# Patient Record
Sex: MALE | Race: OTHER | HISPANIC OR LATINO | Employment: FULL TIME | URBAN - METROPOLITAN AREA
[De-identification: names, ages, dates, MRNs, and addresses within clinical notes are randomized per-mention and may not be internally consistent; named-entity substitution may affect disease eponyms.]

---

## 2018-10-03 RX ORDER — OMEPRAZOLE 40 MG/1
1 CAPSULE, DELAYED RELEASE ORAL
COMMUNITY
Start: 2017-10-04 | End: 2019-05-24 | Stop reason: SDUPTHER

## 2019-05-24 ENCOUNTER — OFFICE VISIT (OUTPATIENT)
Dept: FAMILY MEDICINE CLINIC | Facility: CLINIC | Age: 49
End: 2019-05-24
Payer: COMMERCIAL

## 2019-05-24 VITALS
DIASTOLIC BLOOD PRESSURE: 80 MMHG | BODY MASS INDEX: 27.21 KG/M2 | SYSTOLIC BLOOD PRESSURE: 120 MMHG | HEIGHT: 67 IN | RESPIRATION RATE: 20 BRPM | HEART RATE: 93 BPM | OXYGEN SATURATION: 98 % | WEIGHT: 173.4 LBS | TEMPERATURE: 97.1 F

## 2019-05-24 DIAGNOSIS — K21.9 GASTROESOPHAGEAL REFLUX DISEASE WITHOUT ESOPHAGITIS: ICD-10-CM

## 2019-05-24 DIAGNOSIS — R31.9 HEMATURIA, UNSPECIFIED TYPE: Primary | ICD-10-CM

## 2019-05-24 DIAGNOSIS — N40.0 BENIGN PROSTATIC HYPERPLASIA WITHOUT LOWER URINARY TRACT SYMPTOMS: ICD-10-CM

## 2019-05-24 LAB
SL AMB  POCT GLUCOSE, UA: NEGATIVE
SL AMB LEUKOCYTE ESTERASE,UA: NEGATIVE
SL AMB POCT BILIRUBIN,UA: NEGATIVE
SL AMB POCT BLOOD,UA: ABNORMAL
SL AMB POCT CLARITY,UA: CLEAR
SL AMB POCT COLOR,UA: YELLOW
SL AMB POCT KETONES,UA: NEGATIVE
SL AMB POCT NITRITE,UA: NEGATIVE
SL AMB POCT PH,UA: 5
SL AMB POCT SPECIFIC GRAVITY,UA: 1.02
SL AMB POCT URINE PROTEIN: NEGATIVE
SL AMB POCT UROBILINOGEN: 0.2

## 2019-05-24 PROCEDURE — 99214 OFFICE O/P EST MOD 30 MIN: CPT | Performed by: NURSE PRACTITIONER

## 2019-05-24 PROCEDURE — 81002 URINALYSIS NONAUTO W/O SCOPE: CPT | Performed by: NURSE PRACTITIONER

## 2019-05-24 RX ORDER — TERAZOSIN 1 MG/1
1 CAPSULE ORAL
Qty: 30 CAPSULE | Refills: 1 | Status: SHIPPED | OUTPATIENT
Start: 2019-05-24 | End: 2019-06-28

## 2019-05-24 RX ORDER — OMEPRAZOLE 40 MG/1
40 CAPSULE, DELAYED RELEASE ORAL DAILY
Qty: 30 CAPSULE | Refills: 1 | Status: SHIPPED | OUTPATIENT
Start: 2019-05-24 | End: 2019-07-26 | Stop reason: ALTCHOICE

## 2019-05-28 PROBLEM — R31.9 HEMATURIA: Status: ACTIVE | Noted: 2019-05-28

## 2019-05-28 PROBLEM — N40.0 BENIGN PROSTATIC HYPERPLASIA WITHOUT LOWER URINARY TRACT SYMPTOMS: Status: ACTIVE | Noted: 2019-05-28

## 2019-06-28 ENCOUNTER — OFFICE VISIT (OUTPATIENT)
Dept: FAMILY MEDICINE CLINIC | Facility: CLINIC | Age: 49
End: 2019-06-28
Payer: COMMERCIAL

## 2019-06-28 VITALS
RESPIRATION RATE: 20 BRPM | HEART RATE: 82 BPM | DIASTOLIC BLOOD PRESSURE: 90 MMHG | TEMPERATURE: 97.7 F | OXYGEN SATURATION: 98 % | HEIGHT: 67 IN | BODY MASS INDEX: 27.78 KG/M2 | WEIGHT: 177 LBS | SYSTOLIC BLOOD PRESSURE: 138 MMHG

## 2019-06-28 DIAGNOSIS — Z13.220 SCREENING FOR CHOLESTEROL LEVEL: ICD-10-CM

## 2019-06-28 DIAGNOSIS — N40.1 BENIGN PROSTATIC HYPERPLASIA WITH URINARY FREQUENCY: Primary | ICD-10-CM

## 2019-06-28 DIAGNOSIS — E66.3 OVERWEIGHT (BMI 25.0-29.9): ICD-10-CM

## 2019-06-28 DIAGNOSIS — R35.0 BENIGN PROSTATIC HYPERPLASIA WITH URINARY FREQUENCY: Primary | ICD-10-CM

## 2019-06-28 DIAGNOSIS — Z00.01 ENCOUNTER FOR WELL ADULT EXAM WITH ABNORMAL FINDINGS: ICD-10-CM

## 2019-06-28 DIAGNOSIS — Z23 NEED FOR TDAP VACCINATION: ICD-10-CM

## 2019-06-28 DIAGNOSIS — R73.9 HYPERGLYCEMIA: ICD-10-CM

## 2019-06-28 DIAGNOSIS — R31.9 HEMATURIA, UNSPECIFIED TYPE: ICD-10-CM

## 2019-06-28 LAB
SL AMB  POCT GLUCOSE, UA: NEGATIVE
SL AMB LEUKOCYTE ESTERASE,UA: NEGATIVE
SL AMB POCT BILIRUBIN,UA: NEGATIVE
SL AMB POCT BLOOD,UA: NEGATIVE
SL AMB POCT CLARITY,UA: CLEAR
SL AMB POCT COLOR,UA: NORMAL
SL AMB POCT KETONES,UA: NEGATIVE
SL AMB POCT NITRITE,UA: NEGATIVE
SL AMB POCT PH,UA: 6
SL AMB POCT SPECIFIC GRAVITY,UA: 1
SL AMB POCT URINE PROTEIN: NEGATIVE
SL AMB POCT UROBILINOGEN: 0.2

## 2019-06-28 PROCEDURE — 99214 OFFICE O/P EST MOD 30 MIN: CPT | Performed by: NURSE PRACTITIONER

## 2019-06-28 PROCEDURE — 1036F TOBACCO NON-USER: CPT | Performed by: NURSE PRACTITIONER

## 2019-06-28 PROCEDURE — 81002 URINALYSIS NONAUTO W/O SCOPE: CPT | Performed by: NURSE PRACTITIONER

## 2019-06-28 PROCEDURE — 3008F BODY MASS INDEX DOCD: CPT | Performed by: NURSE PRACTITIONER

## 2019-06-28 RX ORDER — TERAZOSIN 5 MG/1
5 CAPSULE ORAL
Qty: 30 CAPSULE | Refills: 0 | Status: SHIPPED | OUTPATIENT
Start: 2019-06-28 | End: 2019-07-26 | Stop reason: ALTCHOICE

## 2019-07-02 ENCOUNTER — APPOINTMENT (OUTPATIENT)
Dept: LAB | Facility: CLINIC | Age: 49
End: 2019-07-02
Payer: COMMERCIAL

## 2019-07-02 DIAGNOSIS — R73.9 HYPERGLYCEMIA: ICD-10-CM

## 2019-07-02 DIAGNOSIS — Z13.220 SCREENING FOR CHOLESTEROL LEVEL: ICD-10-CM

## 2019-07-02 DIAGNOSIS — Z00.01 ENCOUNTER FOR WELL ADULT EXAM WITH ABNORMAL FINDINGS: ICD-10-CM

## 2019-07-02 DIAGNOSIS — E66.3 OVERWEIGHT (BMI 25.0-29.9): ICD-10-CM

## 2019-07-02 LAB
ALBUMIN SERPL BCP-MCNC: 4.1 G/DL (ref 3.5–5)
ALP SERPL-CCNC: 61 U/L (ref 46–116)
ALT SERPL W P-5'-P-CCNC: 29 U/L (ref 12–78)
ANION GAP SERPL CALCULATED.3IONS-SCNC: 6 MMOL/L (ref 4–13)
AST SERPL W P-5'-P-CCNC: 19 U/L (ref 5–45)
BASOPHILS # BLD AUTO: 0.04 THOUSANDS/ΜL (ref 0–0.1)
BASOPHILS NFR BLD AUTO: 1 % (ref 0–1)
BILIRUB SERPL-MCNC: 1.46 MG/DL (ref 0.2–1)
BUN SERPL-MCNC: 11 MG/DL (ref 5–25)
CALCIUM SERPL-MCNC: 9.2 MG/DL (ref 8.3–10.1)
CHLORIDE SERPL-SCNC: 105 MMOL/L (ref 100–108)
CHOLEST SERPL-MCNC: 194 MG/DL (ref 50–200)
CO2 SERPL-SCNC: 26 MMOL/L (ref 21–32)
CREAT SERPL-MCNC: 1.04 MG/DL (ref 0.6–1.3)
EOSINOPHIL # BLD AUTO: 0.95 THOUSAND/ΜL (ref 0–0.61)
EOSINOPHIL NFR BLD AUTO: 13 % (ref 0–6)
ERYTHROCYTE [DISTWIDTH] IN BLOOD BY AUTOMATED COUNT: 12.1 % (ref 11.6–15.1)
EST. AVERAGE GLUCOSE BLD GHB EST-MCNC: 120 MG/DL
GFR SERPL CREATININE-BSD FRML MDRD: 84 ML/MIN/1.73SQ M
GLUCOSE SERPL-MCNC: 100 MG/DL (ref 65–140)
HBA1C MFR BLD: 5.8 % (ref 4.2–6.3)
HCT VFR BLD AUTO: 44.5 % (ref 36.5–49.3)
HDLC SERPL-MCNC: 35 MG/DL (ref 40–60)
HGB BLD-MCNC: 15 G/DL (ref 12–17)
IMM GRANULOCYTES # BLD AUTO: 0.02 THOUSAND/UL (ref 0–0.2)
IMM GRANULOCYTES NFR BLD AUTO: 0 % (ref 0–2)
LDLC SERPL CALC-MCNC: 121 MG/DL (ref 0–100)
LYMPHOCYTES # BLD AUTO: 3.09 THOUSANDS/ΜL (ref 0.6–4.47)
LYMPHOCYTES NFR BLD AUTO: 40 % (ref 14–44)
MCH RBC QN AUTO: 28.7 PG (ref 26.8–34.3)
MCHC RBC AUTO-ENTMCNC: 33.7 G/DL (ref 31.4–37.4)
MCV RBC AUTO: 85 FL (ref 82–98)
MONOCYTES # BLD AUTO: 0.47 THOUSAND/ΜL (ref 0.17–1.22)
MONOCYTES NFR BLD AUTO: 6 % (ref 4–12)
NEUTROPHILS # BLD AUTO: 3 THOUSANDS/ΜL (ref 1.85–7.62)
NEUTS SEG NFR BLD AUTO: 40 % (ref 43–75)
NONHDLC SERPL-MCNC: 159 MG/DL
NRBC BLD AUTO-RTO: 0 /100 WBCS
PLATELET # BLD AUTO: 317 THOUSANDS/UL (ref 149–390)
PMV BLD AUTO: 11.2 FL (ref 8.9–12.7)
POTASSIUM SERPL-SCNC: 3.9 MMOL/L (ref 3.5–5.3)
PROT SERPL-MCNC: 7.7 G/DL (ref 6.4–8.2)
RBC # BLD AUTO: 5.23 MILLION/UL (ref 3.88–5.62)
SODIUM SERPL-SCNC: 137 MMOL/L (ref 136–145)
TRIGL SERPL-MCNC: 188 MG/DL
TSH SERPL DL<=0.05 MIU/L-ACNC: 1.78 UIU/ML (ref 0.36–3.74)
WBC # BLD AUTO: 7.57 THOUSAND/UL (ref 4.31–10.16)

## 2019-07-02 PROCEDURE — 84443 ASSAY THYROID STIM HORMONE: CPT

## 2019-07-02 PROCEDURE — 80061 LIPID PANEL: CPT

## 2019-07-02 PROCEDURE — 83036 HEMOGLOBIN GLYCOSYLATED A1C: CPT

## 2019-07-02 PROCEDURE — 36415 COLL VENOUS BLD VENIPUNCTURE: CPT

## 2019-07-02 PROCEDURE — 85025 COMPLETE CBC W/AUTO DIFF WBC: CPT

## 2019-07-02 PROCEDURE — 80053 COMPREHEN METABOLIC PANEL: CPT

## 2019-07-26 ENCOUNTER — OFFICE VISIT (OUTPATIENT)
Dept: FAMILY MEDICINE CLINIC | Facility: CLINIC | Age: 49
End: 2019-07-26
Payer: COMMERCIAL

## 2019-07-26 VITALS
HEIGHT: 67 IN | HEART RATE: 92 BPM | RESPIRATION RATE: 16 BRPM | BODY MASS INDEX: 27.31 KG/M2 | OXYGEN SATURATION: 98 % | WEIGHT: 174 LBS | TEMPERATURE: 98.2 F | SYSTOLIC BLOOD PRESSURE: 126 MMHG | DIASTOLIC BLOOD PRESSURE: 80 MMHG

## 2019-07-26 DIAGNOSIS — R10.33 PERIUMBILICAL ABDOMINAL PAIN: ICD-10-CM

## 2019-07-26 DIAGNOSIS — Z00.01 ENCOUNTER FOR ROUTINE ADULT HEALTH EXAMINATION WITH ABNORMAL FINDINGS: Primary | ICD-10-CM

## 2019-07-26 PROCEDURE — 99396 PREV VISIT EST AGE 40-64: CPT | Performed by: FAMILY MEDICINE

## 2019-07-26 NOTE — PROGRESS NOTES
Assessment/Plan:  1  Encounter for routine adult health examination with abnormal findings  During this visit we have a goal to personalize prevention  I discussed the patient about   , the need for a life style plan and decrease the impact of current problems  Health risk assessment was discussed with patient also and the ways to stay healthier  We reviewed also the current medications, the need to avoid polypharmacy in his current treatment; also about how the chronic conditions are impacting now and later  Recommended a healthy diet and exercising frequently will help to control better patient's current chronic conditions;  Immunizations, and the need to compliance with current CDC's recommendations  Patient declined at this time advanced directives  I encouraged against the use alcohol, tobacco, recreational illegal prescribed and non-prescribed drugs, Smoking status Not applicable and the use of cell phone while driving, safe sex  2  Periumbilical abdominal pain  I explained to patient that in patients who have symptoms of irritable bowel syndrome (IBS), the differential diagnosis includes celiac sprue, microscopic and collagenous colitis, atypical Crohns disease for patients with diarrhea-predominant IBS, and chronic constipation (without pain) for those with constipation-predominant IBS  If there are no warning signs, laboratory testing is warranted only if indicated by the history  The current symptoms re benign and are been treated in the needed basis     - H  pylori breath test; Future  - CT abdomen pelvis w wo contrast; Future    No problem-specific Assessment & Plan notes found for this encounter  Diagnoses and all orders for this visit:    Encounter for routine adult health examination with abnormal findings          Subjective:      Patient ID: Patricio Coughlin is a 52 y o  male  Patient is here for PE, not having any acute distress          The following portions of the patient's history were reviewed and updated as appropriate: allergies, current medications, past family history, past medical history, past social history, past surgical history and problem list     Review of Systems   Constitutional: Positive for fatigue  Negative for chills, diaphoresis and fever  HENT: Negative for hearing loss, sinus pressure, sore throat and trouble swallowing  Eyes: Negative for photophobia, pain, redness and visual disturbance  Respiratory: Negative for cough, choking, chest tightness and shortness of breath  Cardiovascular: Negative for chest pain, palpitations and leg swelling  Gastrointestinal: Positive for abdominal distention and abdominal pain  This is a chronic problem  The current episode started more than 1 year ago  The onset quality is sudden  The problem occurs intermittently  The problem has been waxing and waning  The pain is located in the generalized abdominal region  The pain is at a severity of 5/10  The pain is moderate  The quality of the pain is colicky  Pertinent negatives include no arthralgias, dysuria, fever or headaches  Exacerbated by: stress  The pain is relieved by nothing  He has tried nothing for the symptoms  Prior diagnostic workup includes upper endoscopy  There is no history of abdominal surgery, colon cancer, Crohn's disease, gallstones, GERD, irritable bowel syndrome, pancreatitis, PUD or ulcerative colitis  Genitourinary: Negative for difficulty urinating, dysuria, enuresis and flank pain  Musculoskeletal: Negative for arthralgias, back pain, gait problem and joint swelling  Neurological: Negative for dizziness, facial asymmetry, light-headedness and headaches  Psychiatric/Behavioral: Negative for agitation, behavioral problems, confusion and decreased concentration           Objective:      /80 (BP Location: Left arm, Patient Position: Sitting, Cuff Size: Standard)   Pulse 92   Temp 98 2 °F (36 8 °C) (Oral)   Resp 16   Ht 5' 7" (1 702 m)   Wt 78 9 kg (174 lb)   SpO2 98%   BMI 27 25 kg/m²          Physical Exam   Constitutional: He is oriented to person, place, and time  He appears well-developed  HENT:   Head: Normocephalic  Eyes: Pupils are equal, round, and reactive to light  Neck: Normal range of motion  Cardiovascular: Normal rate  Pulmonary/Chest: Effort normal and breath sounds normal    Abdominal: Soft  Genitourinary: Rectum normal, prostate normal and penis normal    Musculoskeletal: Normal range of motion  Neurological: He is alert and oriented to person, place, and time  Skin: Skin is warm and dry  Psychiatric: He has a normal mood and affect  His behavior is normal  Judgment and thought content normal    Nursing note and vitals reviewed

## 2021-07-19 ENCOUNTER — TELEPHONE (OUTPATIENT)
Dept: FAMILY MEDICINE CLINIC | Facility: CLINIC | Age: 51
End: 2021-07-19

## 2021-07-19 NOTE — TELEPHONE ENCOUNTER
Patient called left message for patient to call the office to schedule a follow up appt   Will send a letter

## 2021-07-19 NOTE — LETTER
Dear Nena Lyon,      Please contact the office to schedule a follow up appointment              Dr Esmer Ortega MD

## 2023-02-14 ENCOUNTER — APPOINTMENT (OUTPATIENT)
Dept: LAB | Facility: HOSPITAL | Age: 53
End: 2023-02-14

## 2023-02-14 ENCOUNTER — OFFICE VISIT (OUTPATIENT)
Dept: FAMILY MEDICINE CLINIC | Facility: CLINIC | Age: 53
End: 2023-02-14

## 2023-02-14 VITALS
WEIGHT: 170 LBS | HEIGHT: 66 IN | BODY MASS INDEX: 27.32 KG/M2 | DIASTOLIC BLOOD PRESSURE: 80 MMHG | HEART RATE: 78 BPM | TEMPERATURE: 98 F | SYSTOLIC BLOOD PRESSURE: 126 MMHG | OXYGEN SATURATION: 98 % | RESPIRATION RATE: 16 BRPM

## 2023-02-14 DIAGNOSIS — Z11.4 SCREENING FOR HUMAN IMMUNODEFICIENCY VIRUS: ICD-10-CM

## 2023-02-14 DIAGNOSIS — N13.8 BPH WITH URINARY OBSTRUCTION: ICD-10-CM

## 2023-02-14 DIAGNOSIS — N40.1 BPH WITH URINARY OBSTRUCTION: ICD-10-CM

## 2023-02-14 DIAGNOSIS — Z11.59 NEED FOR HEPATITIS C SCREENING TEST: ICD-10-CM

## 2023-02-14 DIAGNOSIS — N41.1 CHRONIC PROSTATITIS: ICD-10-CM

## 2023-02-14 DIAGNOSIS — R73.03 PRE-DIABETES: ICD-10-CM

## 2023-02-14 DIAGNOSIS — Z12.11 COLON CANCER SCREENING: ICD-10-CM

## 2023-02-14 DIAGNOSIS — Z00.01 ENCOUNTER FOR GENERAL ADULT MEDICAL EXAMINATION WITH ABNORMAL FINDINGS: Primary | ICD-10-CM

## 2023-02-14 LAB
HEMOCCULT STL QL IA: NEGATIVE
SL AMB POCT HEMOGLOBIN AIC: 6 (ref ?–6.5)

## 2023-02-14 RX ORDER — DOXYCYCLINE HYCLATE 100 MG
100 TABLET ORAL 2 TIMES DAILY
Qty: 20 TABLET | Refills: 0 | Status: SHIPPED | OUTPATIENT
Start: 2023-02-14 | End: 2023-02-24

## 2023-02-14 RX ORDER — TAMSULOSIN HYDROCHLORIDE 0.4 MG/1
0.4 CAPSULE ORAL
Qty: 30 CAPSULE | Refills: 5 | Status: SHIPPED | OUTPATIENT
Start: 2023-02-14

## 2023-02-14 NOTE — PROGRESS NOTES
Name: Pepe Acosta      : 1970      MRN: [id number]  Encounter Provider: Georgia Enriquez MD  Encounter Date: 2023   Encounter department: [facility]   The physical exam is remarkable by enlarged prostate and tenderness  I believe patient might be suffering of prostatitis  Starting doxycycline to take 1 tablet 100 mg  twice a day for 10 days  He is prediabetic  The importance of control carbohydrate and exercise was stressed  Also we discussed again about the need for colon cancer screening  1  Encounter for general adult medical examination with abnormal findings  -     CBC and differential; Future  -     Comprehensive metabolic panel; Future  -     Lipid panel; Future    2  Pre-diabetes  -     POCT hemoglobin A1c    3  BMI 27 0-27 9,adult    4  Screening for human immunodeficiency virus  -     : HIV 1/2 AB/AG w Reflex [facility] for 2 yr old and above; Future    5  Colon cancer screening  -     Occult Blood, Fecal Immunochemical; Future    6  Chronic prostatitis  -     doxycycline hyclate (VIBRA-TABS) 100 mg tablet; Take 1 tablet (100 mg total) by mouth 2 (two) times a day for 10 days    7  BPH with urinary obstruction  -     tamsulosin (FLOMAX) 0 4 mg; Take 1 capsule (0 4 mg total) by mouth daily with dinner  -     PSA, total and free; Future; Expected date: 2023    8  Need for hepatitis C screening test  -     Hepatitis C antibody; Future           Subjective      HPI  Review of Systems   Constitutional: Negative for diaphoresis, fatigue, fever and unexpected weight change  Respiratory: Negative for apnea, cough, choking, chest tightness and shortness of breath  Cardiovascular: Negative for chest pain, palpitations and leg swelling  Gastrointestinal: Negative for abdominal distention, abdominal pain, anal bleeding, blood in stool and constipation     Genitourinary: Positive for difficulty urinating and frequency ( And pelvic pain at the suprapubic area  )  Musculoskeletal: Negative for arthralgias, back pain, gait problem and joint swelling  Neurological: Negative for dizziness, facial asymmetry, light-headedness and headaches  Psychiatric/Behavioral: Negative for behavioral problems, dysphoric mood and self-injury  The patient is not nervous/anxious  No current outpatient medications on file prior to visit  Objective     /80 (BP Location: Left arm, Patient Position: Sitting, Cuff Size: Standard)   Pulse 78   Temp 98 °F (36 7 °C) (Tympanic)   Resp 16   Ht 5' 6" (1 676 m)   Wt 77 1 kg (170 lb)   SpO2 98%   BMI 27 44 kg/m²     Physical Exam  Vitals and nursing note reviewed  Neck:      Thyroid: No thyroid mass or thyromegaly  Vascular: No carotid bruit or JVD  Trachea: No tracheal tenderness  Cardiovascular:      Rate and Rhythm: Normal rate and regular rhythm  No extrasystoles are present  Pulses: Normal pulses  Heart sounds: Normal heart sounds  Heart sounds not distant  No friction rub  Pulmonary:      Effort: Pulmonary effort is normal  No tachypnea or bradypnea  Breath sounds: Normal breath sounds  No stridor  Abdominal:      General: Bowel sounds are normal  There is no abdominal bruit  Palpations: Abdomen is soft  There is no hepatomegaly or splenomegaly  Hernia: No hernia is present  Genitourinary:     Prostate: Enlarged and tender  Musculoskeletal:         General: Normal range of motion  Cervical back: No edema or rigidity  Skin:     General: Skin is warm and dry  Neurological:      Mental Status: He is oriented to person, place, and time  Deep Tendon Reflexes: Reflexes are normal and symmetric  Psychiatric:         Behavior: Behavior normal          Thought Content: Thought content normal          Judgment: Judgment normal        Georgia Enriquez MD  BMI Counseling: Body mass index is 27 44 kg/m²   The BMI is above normal  Nutrition recommendations include 3-5 servings of fruits/vegetables daily, reducing fast food intake and consuming healthier snacks  Exercise recommendations include moderate aerobic physical activity for 150 minutes/week

## 2024-03-06 ENCOUNTER — TELEPHONE (OUTPATIENT)
Dept: FAMILY MEDICINE CLINIC | Facility: CLINIC | Age: 54
End: 2024-03-06

## 2024-08-01 ENCOUNTER — TELEPHONE (OUTPATIENT)
Dept: FAMILY MEDICINE CLINIC | Facility: CLINIC | Age: 54
End: 2024-08-01

## 2024-10-04 ENCOUNTER — TELEPHONE (OUTPATIENT)
Dept: DENTISTRY | Facility: CLINIC | Age: 54
End: 2024-10-04

## 2024-10-10 ENCOUNTER — OFFICE VISIT (OUTPATIENT)
Dept: DENTISTRY | Facility: CLINIC | Age: 54
End: 2024-10-10

## 2024-10-10 VITALS — DIASTOLIC BLOOD PRESSURE: 86 MMHG | HEART RATE: 89 BPM | SYSTOLIC BLOOD PRESSURE: 133 MMHG

## 2024-10-10 DIAGNOSIS — K08.89 PAIN, DENTAL: Primary | ICD-10-CM

## 2024-10-10 PROCEDURE — D0220 INTRAORAL - PERIAPICAL FIRST RADIOGRAPHIC IMAGE: HCPCS

## 2024-10-10 PROCEDURE — D0140 LIMITED ORAL EVALUATION - PROBLEM FOCUSED: HCPCS

## 2024-10-10 PROCEDURE — D0270 BITEWING - SINGLE RADIOGRAPHIC IMAGE: HCPCS

## 2024-10-10 RX ORDER — AMOXICILLIN 500 MG/1
500 CAPSULE ORAL EVERY 8 HOURS SCHEDULED
Qty: 21 CAPSULE | Refills: 0 | Status: SHIPPED | OUTPATIENT
Start: 2024-10-10 | End: 2024-10-17

## 2024-10-10 NOTE — DENTAL PROCEDURE DETAILS
.Limited Exam    Juwan Buck 54 y.o. male presents with self to [facility] for Limited exam  PMH reviewed, no changes, ASA II. Significant medical history: none. Significant allergies: sulfa antibiotics. Significant medications: none.    Chief complaint:  My top tooth is loose- patient points to #14    Consent:  Discussed that limited exam focuses on problem area, and same day tx is not guaranteed.  Patient explained to if they wish to have anything else evaluated, they need to return to the practice at which they are a patient of record or schedule a comprehensive exam afterwards.  Patient understands and consent was given by self via verbal consent.    Subjective history:    Onset: 2 weeks ago.   Provocation: Biting, Chewing.   Quality:  patient cannot describe .   Region: Patient points to tooth #14 .   Severity: 5/10.   Timing: lasts for hours, taking aspirin to relieve pain and help sleep .    Objective clinical findings:   Oral cancer screening: normal.   Extraoral exam: no remarkable findings.  Intraoral exam:  Fracture #14 .     Radiographs: Single PA - 14 and Single BW - 14 .  Horizontal fracture line to the bone level      Assessment:  #14 fractured tooth non-restorable    Plan:   Extract #14    Referral(s): None needed.  Rx: Amoxicillin.  Comprehensive care disposition: Patient expressed interest in becoming a patient of record with one of the  dental clinics.    Patient dismissed ambulatory and alert.    NV: #14 Surgical extraction .    Attending: Dr. Luu was present in clinic.

## 2024-10-11 ENCOUNTER — OFFICE VISIT (OUTPATIENT)
Dept: DENTISTRY | Facility: CLINIC | Age: 54
End: 2024-10-11

## 2024-10-11 VITALS — SYSTOLIC BLOOD PRESSURE: 148 MMHG | HEART RATE: 91 BPM | DIASTOLIC BLOOD PRESSURE: 91 MMHG

## 2024-10-11 DIAGNOSIS — S02.5XXD OPEN FRACTURE OF TOOTH WITH ROUTINE HEALING, SUBSEQUENT ENCOUNTER: Primary | ICD-10-CM

## 2024-10-11 PROCEDURE — D9110 PALLIATIVE (EMERGENCY) TREATMENT OF DENTAL PAIN - MINOR PROCEDURE: HCPCS

## 2024-10-11 NOTE — DENTAL PROCEDURE DETAILS
Pt presented with self to [facility] originally for #14 surgical ext. PMH reviewed. ASA II.    CC: I'm here to get my broken tooth taken out    Pt was seen as an emergency in [place] yesterday on 10/10/24. Pt reports no pain but says the loose part of his crown has been bothering him. Explained to pt that we will need to reschedule the extraction for when Dr. ROPER returns since the extraction of #14 will be complex due to the fracture extending subgingivally. Pt understood.    Pt requested the loose buccal portion of crown to at least be removed for his own comfort. Explained to pt that removing the fractured buccal portion may induce symptoms later if the tooth is not extracted soon like pulpal exposure. Pt understood.     Once removed, pulpal chamber was exposed. Informed pt that we will need to rinse out the tooth and then apply medication + filling material as a way to ease any sensitivity until he can return for the actual extraction. Pt understood.    Anesthetic: Topical benzocaine, 1 carpule 2% lidocaine w/ 1:100k epi (buccal infiltration of #14)    Treatment:  - Used CHX to rinse out #14  - Isolation with cotton roll and high speed suction  - Air dried surface and applied limelight over pulpal exposure. Light cured twice.  - Acid etched, bonded, and A2 flowable composite applied to cover the limelight.    Patient left clinic ambulatory and alert.    NV: Extract #14 when Dr. ROPER returns

## 2024-11-18 ENCOUNTER — OFFICE VISIT (OUTPATIENT)
Dept: DENTISTRY | Facility: CLINIC | Age: 54
End: 2024-11-18

## 2024-11-18 VITALS — SYSTOLIC BLOOD PRESSURE: 138 MMHG | HEART RATE: 93 BPM | DIASTOLIC BLOOD PRESSURE: 87 MMHG

## 2024-11-18 DIAGNOSIS — K08.89 NONRESTORABLE TOOTH: Primary | ICD-10-CM

## 2024-11-18 DIAGNOSIS — K02.61 DENTAL CARIES ON SMOOTH SURFACE LIMITED TO ENAMEL: ICD-10-CM

## 2024-11-18 PROCEDURE — D7210 EXTRACTION, ERUPTED TOOTH REQUIRING REMOVAL OF BONE AND/OR SECTIONING OF TOOTH, AND INCLUDING ELEVATION OF MUCOPERIOSTEAL FLAP IF INDICATED: HCPCS

## 2024-11-18 RX ORDER — IBUPROFEN 600 MG/1
600 TABLET, FILM COATED ORAL EVERY 6 HOURS PRN
Qty: 30 TABLET | Refills: 0 | Status: SHIPPED | OUTPATIENT
Start: 2024-11-18 | End: 2024-11-26

## 2024-11-18 RX ORDER — IBUPROFEN 600 MG/1
TABLET, FILM COATED ORAL EVERY 6 HOURS PRN
Qty: 30 TABLET | Refills: 0 | Status: CANCELLED | OUTPATIENT
Start: 2024-11-18

## 2024-11-19 NOTE — PROGRESS NOTES
Procedure Details  14  - EXTRACTION, ERUPTED TOOTH REQUIRING REMOVAL OF BONE AND/OR SECTIONING OF TOOTH, AND INCLUDING ELEVATION OF MUCOPERIOSTEAL FLAP IF INDICATED    Extraction #14    Juwan Buck 54 y.o. male presents with self to [facility] for extraction #14  PMH reviewed, no changes, ASA II. Significant medical history: Hematuria and benign prostatic hyperplasia. Significant allergies: Sulfa abx. Significant medications: Tamsulosin.  Pain level 2/10    Diagnosis:  Teeth #14 indicated for extraction due to Nonrestorable caries.    Consent:  Risks of specific procedure: pain, bleeding, swelling, infection, tooth fracturing to point of requiring surgical removal, damage to adjacent teeth and/or restorations on them.  Risks of any dental procedure: post procedural pain or sensitivity, local anesthetic side effects, allergic reaction to dental materials and medications, breakage of local anesthetic needle, aspiration of small dental tools, injury to nearby hard and soft tissues and anatomical structures.  Benefits: Relieve pain or underlying infection and prevent future or further progression of infection.  Alternatives: No tx.  Tx plan for extraction #14 reviewed, pt given opportunity to ask questions, all questions answered to degree of medical and dental certainty.  Patient understands and consent given by self via verbal consent and signed oral surgery informed consent.    Universal Protocol  Other Assisting Provider: Yes, Abdirashid (assistant)  Verbal consent obtained? YES  Written consent obtained?  YES  Risks, benefits and alternatives discussed?: YES  Consent given by: self  Time Out  Immediately prior to the procedure a time out was called: YES  Time Out:  Time Out performed at:  2:40 PM  A time out verifies correct patient, procedure, equipment, support staff and site/side marked as required.  Patient states understanding of procedure being performed: YES  Patient's understanding of procedure matches  consent: YES  Procedure consent matches procedure scheduled: YES  Test results available and properly labeled: N/A  Site  Verified with the patient  YES  Radiology Images displayed and confirmed.  If images not available, report reviewed:  YES  Required items - Required blood products, implants, devices and special equipment available: YES  Patient identity confirmed:  YES    Anesthesia:  Topical 20% benzocaine.  1 carps 2% Lidocaine 1:100k epi via buccal infiltration and palatal/lingual infiltration.  1 carps 4% Septocaine 1:100k epi via buccal infiltration.    Procedure details:  Made two vertical releasing incisions on buccal gingiva, and reflected gingiva with periosteal elevator.  Created buccal trough on alveolar ridge of tooth #14. Sectioned #14 into MB, DB, and palatal halves/roots.  Elevated and extracted #14 with straight elevator and forceps.  Socket curetted and irrigated with sterile saline.  Manual alveolar compression with gauze 30 seconds. Hemostasis achieved.  4-0 chromic gut sutures placed - Simple interrupted sutures to close vertical releasing incisions.     Post-op instructions given verbally and on paper.  Patient given ice and gauze.    Rx: Ibuprofen.    Patient dismissed ambulatory and alert.    NV: Will be scheduled at nearest available date for comp exam.    Attending: Dr. Moreno checked extraction socket .

## 2024-12-03 ENCOUNTER — TELEPHONE (OUTPATIENT)
Dept: FAMILY MEDICINE CLINIC | Facility: CLINIC | Age: 54
End: 2024-12-03

## 2025-05-21 ENCOUNTER — TELEPHONE (OUTPATIENT)
Dept: FAMILY MEDICINE CLINIC | Facility: CLINIC | Age: 55
End: 2025-05-21

## 2025-05-21 NOTE — LETTER
[facility] [address], [address]  [facility] [address]  Phone#  [phone]  Fax#  [phone]      May 21, 2025      Dear:   Juwan Buck         Our office has attempted to contact you several times regarding your Appointment.  Could you please contact our office at [phone].    Thank you.     Sincerely,      Vin Vanegas MD

## 2025-07-22 ENCOUNTER — OFFICE VISIT (OUTPATIENT)
Dept: FAMILY MEDICINE CLINIC | Facility: CLINIC | Age: 55
End: 2025-07-22

## 2025-07-22 VITALS
WEIGHT: 169 LBS | TEMPERATURE: 98 F | OXYGEN SATURATION: 100 % | DIASTOLIC BLOOD PRESSURE: 80 MMHG | RESPIRATION RATE: 16 BRPM | SYSTOLIC BLOOD PRESSURE: 138 MMHG | HEART RATE: 76 BPM | HEIGHT: 66 IN | BODY MASS INDEX: 27.16 KG/M2

## 2025-07-22 DIAGNOSIS — N40.1 BPH WITH URINARY OBSTRUCTION: ICD-10-CM

## 2025-07-22 DIAGNOSIS — K21.9 GASTROESOPHAGEAL REFLUX DISEASE WITHOUT ESOPHAGITIS: ICD-10-CM

## 2025-07-22 DIAGNOSIS — Z00.01 ENCOUNTER FOR GENERAL ADULT MEDICAL EXAMINATION WITH ABNORMAL FINDINGS: Primary | ICD-10-CM

## 2025-07-22 DIAGNOSIS — Z11.4 SCREENING FOR HUMAN IMMUNODEFICIENCY VIRUS: ICD-10-CM

## 2025-07-22 DIAGNOSIS — N13.8 BPH WITH URINARY OBSTRUCTION: ICD-10-CM

## 2025-07-22 DIAGNOSIS — Z11.59 NEED FOR HEPATITIS C SCREENING TEST: ICD-10-CM

## 2025-07-22 DIAGNOSIS — Z12.11 COLON CANCER SCREENING: ICD-10-CM

## 2025-07-22 DIAGNOSIS — R73.03 PRE-DIABETES: ICD-10-CM

## 2025-07-22 PROBLEM — R31.9 HEMATURIA: Status: RESOLVED | Noted: 2019-05-28 | Resolved: 2025-07-22

## 2025-07-22 PROBLEM — R35.0 BENIGN PROSTATIC HYPERPLASIA WITH URINARY FREQUENCY: Status: RESOLVED | Noted: 2019-06-28 | Resolved: 2025-07-22

## 2025-07-22 LAB — HEMOCCULT STL QL IA: NEGATIVE

## 2025-07-22 PROCEDURE — G0328 FECAL BLOOD SCRN IMMUNOASSAY: HCPCS | Performed by: FAMILY MEDICINE

## 2025-07-22 PROCEDURE — 99396 PREV VISIT EST AGE 40-64: CPT | Performed by: FAMILY MEDICINE

## 2025-07-22 RX ORDER — PANTOPRAZOLE SODIUM 20 MG/1
20 TABLET, DELAYED RELEASE ORAL
Qty: 30 TABLET | Refills: 5 | Status: SHIPPED | OUTPATIENT
Start: 2025-07-22 | End: 2026-01-18

## 2025-07-22 RX ORDER — FINASTERIDE 5 MG/1
5 TABLET, FILM COATED ORAL DAILY
Qty: 100 TABLET | Refills: 3 | Status: SHIPPED | OUTPATIENT
Start: 2025-07-22

## 2025-07-22 NOTE — PROGRESS NOTES
Name: Juwan Buck      : 1970      MRN: [id number]  Encounter Provider: Vin Vanegas MD  Encounter Date: 2025   Encounter department: [facility]    Assessment & Plan  Encounter for general adult medical examination with abnormal findings    Orders:    CBC and differential; Future    Comprehensive metabolic panel; Future    Lipid Panel with Direct LDL reflex; Future    Pre-diabetes    Orders:    Hemoglobin A1C; Future    Need for hepatitis C screening test    Orders:    Hepatitis C antibody; Future    Screening for human immunodeficiency virus    Orders:    HIV 1/2 AG/AB w Reflex [facility] for 2 yr old and above; Future    Colon cancer screening    Orders:    iFOBT/FIT; Future    BPH with urinary obstruction    Orders:    finasteride (PROSCAR) 5 mg tablet; Take 1 tablet (5 mg total) by mouth daily    Gastroesophageal reflux disease without esophagitis    Orders:    H. pylori antigen, stool; Future    pantoprazole (PROTONIX) 20 mg tablet; Take 1 tablet (20 mg total) by mouth daily before breakfast

## 2025-07-22 NOTE — PROGRESS NOTES
Name: Juwan Buck      : 1970      MRN: [id number]  Encounter Provider: Vin Vanegas MD  Encounter Date: 2025   Encounter department: [facility]    Assessment & Plan  Encounter for general adult medical examination with abnormal findings  Juwan is here for a physical exam. I found him without any distress. The patient has the following chronic conditions   Patient Active Problem List   Diagnosis    Benign prostatic hyperplasia without lower urinary tract symptoms    Gastroesophageal reflux disease without esophagitis   .   I recommended that He exercise for at least 3.5 hours each week and maintain a healthy diet low in carbohydrates and saturated fats. I provided her with information about lifestyle modifications. The patient understands the importance of having an annual physical exam.       Benign Prostatic Hyperplasia (BPH) with urinary obstruction: Patient reports nocturia and frequent urination. Will start finasteride 5mg daily to reduce prostate size and improve urinary symptoms. Counseled patient that this medication may have the side effect of promoting hair growth, which is not the primary indication in this case. Will monitor symptoms at follow-up visit.       Gastroesophageal Reflux Disease (GERD): Patient reports heartburn after meals, especially when eating late in the evening, with nocturnal acid reflux symptoms. Ordered H. pylori stool antigen test to rule out H. pylori infection. Starting pantoprazole 20mg daily before breakfast. Recommended lifestyle modifications including avoiding late meals (nothing after 6 PM), elevating head of bed, and avoiding trigger foods. If symptoms persist after 2 months of treatment, will consider referral for endoscopy.     Pre-diabetes: Will monitor with ordered HbA1c test.     Preventive Care: Ordered routine labs including CBC, CMP, lipid panel. Also ordered age-appropriate screenings including Hepatitis  "C antibody, HIV testing, and iFOBT/FIT for colorectal cancer screening as part of annual physical exam.  Orders:    CBC and differential; Future    Comprehensive metabolic panel; Future    Lipid Panel with Direct LDL reflex; Future    Pre-diabetes    Orders:    Hemoglobin A1C; Future    Need for hepatitis C screening test    Orders:    Hepatitis C antibody; Future    Screening for human immunodeficiency virus    Orders:    HIV 1/2 AG/AB w Reflex [facility] for 2 yr old and above; Future    Colon cancer screening    Orders:    iFOBT/FIT; Future    BPH with urinary obstruction    Orders:    finasteride (PROSCAR) 5 mg tablet; Take 1 tablet (5 mg total) by mouth daily    Gastroesophageal reflux disease without esophagitis    Orders:    H. pylori antigen, stool; Future    pantoprazole (PROTONIX) 20 mg tablet; Take 1 tablet (20 mg total) by mouth daily before breakfast             Subjective:  Mr. Buck is a 55-year-old male presenting for follow-up of multiple chronic conditions and annual physical exam. Patient reports urinary symptoms including nocturia and frequent urination. He describes discomfort in the bladder and colon region. Patient also complains of heartburn (\"aldor\") after eating, particularly when consuming food after 6 PM. He reports nocturnal symptoms of acid reflux, stating that he wakes up at night feeling like \"something comes up.\" Patient recently traveled to [place] approximately 3 months ago. Patient requested referral to gastroenterology for his stomach pain, but after discussion, agreed to first try empiric treatment for possible H. pylori infection and GERD with appropriate testing before considering specialist referral.    PE:  /80 (BP Location: Left arm, Patient Position: Sitting, Cuff Size: Standard)   Pulse 76   Temp 98 °F (36.7 °C) (Tympanic)   Resp 16   Ht 5' 6\" (1.676 m)   Wt 76.7 kg (169 lb)   SpO2 100%   BMI 27.28 kg/m²     The patient appears to be in no distress and is " oriented. The neck is supple, showing no signs of thyromegaly or enlarged lymph nodes, and there is no carotid bruit. The lungs are clear, and the heart has a normal rhythm with no murmurs. The abdomen is soft and non-tender, with no masses or organomegalies. The testicles are normal, and the prostate exam shows the prostate is enlarged size but free of nodules. There are no abnormal lymph nodes.

## 2025-07-22 NOTE — ASSESSMENT & PLAN NOTE
Orders:    H. pylori antigen, stool; Future    pantoprazole (PROTONIX) 20 mg tablet; Take 1 tablet (20 mg total) by mouth daily before breakfast